# Patient Record
Sex: MALE | Race: WHITE | Employment: FULL TIME | URBAN - METROPOLITAN AREA
[De-identification: names, ages, dates, MRNs, and addresses within clinical notes are randomized per-mention and may not be internally consistent; named-entity substitution may affect disease eponyms.]

---

## 2017-02-26 DIAGNOSIS — J45.990 EXERCISE-INDUCED ASTHMA: Chronic | ICD-10-CM

## 2017-02-26 DIAGNOSIS — E78.00 HYPERCHOLESTEROLEMIA: Chronic | ICD-10-CM

## 2017-02-27 RX ORDER — ATORVASTATIN CALCIUM 10 MG/1
TABLET, FILM COATED ORAL
Qty: 30 TABLET | Refills: 0 | Status: SHIPPED | OUTPATIENT
Start: 2017-02-27

## 2017-02-27 RX ORDER — MONTELUKAST SODIUM 10 MG/1
TABLET ORAL
Qty: 30 TABLET | Refills: 0 | Status: SHIPPED | OUTPATIENT
Start: 2017-02-27

## 2018-09-07 ENCOUNTER — HOSPITAL ENCOUNTER (OUTPATIENT)
Dept: PULMONOLOGY | Age: 53
Discharge: HOME | End: 2018-09-07
Attending: FAMILY MEDICINE | Admitting: FAMILY MEDICINE

## 2018-09-07 VITALS — OXYGEN SATURATION: 96 % | HEART RATE: 84 BPM | RESPIRATION RATE: 18 BRPM

## 2018-09-07 DIAGNOSIS — J45.909 UNCOMPLICATED ASTHMA: ICD-10-CM

## 2018-09-07 RX ORDER — LEVALBUTEROL TARTRATE 45 UG/1
4 AEROSOL, METERED ORAL ONCE
Status: COMPLETED | OUTPATIENT
Start: 2018-09-07 | End: 2018-09-07

## 2018-09-07 RX ORDER — ALBUTEROL SULFATE 90 UG/1
4 AEROSOL, METERED RESPIRATORY (INHALATION) ONCE
Status: CANCELLED | OUTPATIENT
Start: 2018-09-07

## 2018-09-07 RX ADMIN — LEVALBUTEROL TARTRATE 4 PUFF: 45 AEROSOL, METERED ORAL at 14:09

## 2020-02-18 ENCOUNTER — HOSPITAL ENCOUNTER (OUTPATIENT)
Dept: NON INVASIVE DIAGNOSTICS | Age: 55
Discharge: HOME | End: 2020-02-18
Payer: COMMERCIAL

## 2020-02-18 PROCEDURE — 93017 CV STRESS TEST TRACING ONLY: CPT | Performed by: INTERNAL MEDICINE

## 2021-08-26 ENCOUNTER — HOSPITAL ENCOUNTER (OUTPATIENT)
Dept: GENERAL RADIOLOGY | Age: 56
Discharge: HOME | End: 2021-08-26
Payer: COMMERCIAL

## 2021-08-26 ENCOUNTER — HOSPITAL ENCOUNTER (OUTPATIENT)
Dept: VASCULAR LAB | Age: 56
Discharge: HOME | End: 2021-08-26
Payer: COMMERCIAL

## 2021-08-26 ENCOUNTER — HOSPITAL ENCOUNTER (OUTPATIENT)
Age: 56
Discharge: HOME | End: 2021-08-26
Payer: COMMERCIAL

## 2021-08-26 DIAGNOSIS — R42 VERTIGO: ICD-10-CM

## 2021-08-26 DIAGNOSIS — H81.10 BENIGN PAROXYSMAL POSITIONAL VERTIGO, UNSPECIFIED LATERALITY: ICD-10-CM

## 2021-08-26 PROCEDURE — 72040 X-RAY EXAM NECK SPINE 2-3 VW: CPT

## 2021-08-26 PROCEDURE — 93880 EXTRACRANIAL BILAT STUDY: CPT

## 2021-10-04 ENCOUNTER — OFFICE VISIT (OUTPATIENT)
Dept: ENT CLINIC | Age: 56
End: 2021-10-04
Payer: COMMERCIAL

## 2021-10-04 VITALS — DIASTOLIC BLOOD PRESSURE: 88 MMHG | SYSTOLIC BLOOD PRESSURE: 132 MMHG | HEIGHT: 75 IN | BODY MASS INDEX: 29.25 KG/M2

## 2021-10-04 DIAGNOSIS — H81.12 BENIGN PAROXYSMAL POSITIONAL VERTIGO OF LEFT EAR: Primary | ICD-10-CM

## 2021-10-04 PROCEDURE — 99204 OFFICE O/P NEW MOD 45 MIN: CPT | Performed by: OTOLARYNGOLOGY

## 2021-10-04 ASSESSMENT — ENCOUNTER SYMPTOMS
RHINORRHEA: 0
SORE THROAT: 0
VOICE CHANGE: 0
TROUBLE SWALLOWING: 0
SINUS PAIN: 0

## 2021-10-04 NOTE — PATIENT INSTRUCTIONS
[place] Exercises for Vertigo: Care Instructions  Your Care Instructions  Simple exercises can help you regain your balance when you have vertigo. If you have Ménière's disease, benign paroxysmal positional vertigo (BPPV), or another inner ear problem, you may have vertigo off and on. Do these exercises first thing in the morning and before you go to bed. You might get dizzy when you first start them. If this happens, try to do them for at least 5 minutes. Do a group of exercises at a time, starting at the top of the list. It may take several weeks before you can do all the exercises without feeling dizzy. Follow-up care is a key part of your treatment and safety. Be sure to make and go to all appointments, and call your doctor if you are having problems. It's also a good idea to know your test results and keep a list of the medicines you take. How can you care for yourself at home? Exercise 1  While sitting on the side of the bed and holding your head still:  · Look up as far as you can. · Look down as far as you can. · Look from side to side as far as you can. · Stretch your arm straight out in front of you. Focus on your index finger. Continue to focus on your finger while you bring it to your nose. Exercise 2  While sitting on the side of the bed:  · Bring your head as far back as you can. · Bring your head forward to touch your chin to your chest.  · Turn your head from side to side. · Do these exercises first with your eyes open. Then try with your eyes closed. Exercise 3  While sitting on the side of the bed:  · Shrug your shoulders straight upward, then relax them. · Bend over and try to touch the ground with your fingers. Then go back to a sitting position. · Toss a small ball from one hand to the other. Throw the ball higher than your eyes so you have to look up.   Exercise 4  While standing (with someone close by if you feel uncomfortable):  · Repeat Exercise 1.  · Repeat Exercise 2.  · Pass a ball between your legs and above your head. · Sit down and then stand up. Repeat. Turn around in a [place] a different way each time you stand. · With someone close by to help you, try the above exercises with your eyes closed. Exercise 5  In a room that is cleared of obstacles:  · Walk to a corner of the room, turn to your right, and walk back to the starting point. Now, repeat and turn left. · Walk up and down a slope. Now try stairs. · While holding on to someone's arm, try these exercises with your eyes closed. When should you call for help? Watch closely for changes in your health, and be sure to contact your doctor if:    · You do not get better as expected. Where can you learn more? Go to [email or web address] [email or web address]. org and sign in to your [employer] account. Enter I732 in the [employer] box to learn more about \"C[place]orne Exercises for Vertigo: Care Instructions. \"     If you do not have an account, please click on the \"Sign Up Now\" link. Current as of: December 2, 2020               Content Version: 13.0  © 0669-3952 Healthwise, Incorporated. Care instructions adapted under license by [facility] ([facility]). If you have questions about a medical condition or this instruction, always ask your healthcare professional. [address] any warranty or liability for your use of this information.

## 2021-10-04 NOTE — PROGRESS NOTES
[facility]       NEW PATIENT VISIT      PCP:  Kenroy Garcia DO      REFERRED BY:   Kenroy Garcia DO      CHIEF COMPLAINT  Chief Complaint   Patient presents with    Dizziness       HISTORY OF PRESENT ILLNESS       Trell Mckeon is a 55 y.o. male here for evaluation and treatment of dizziness/vertigo, for 2 months and which is intermittent, off and on. The onset of this problem occurred about 2 months ago. Patient denied any antecedent illness or ear or head trauma. Patient denied any modifying factors. Patient denied any associated symptoms. real bad two months ago and it's improved but it's still there. It has not gone completely away. \"    Dizziness was described as a sensation of \"the room or environment spinning. \"  No LOC or seizures. Dizziness is precipitated turn over onto the left side when I'm lying in bed. When I lean and to my left. Laying in bad and roll onto the left side, not right side. No other precipitating factors have been noted. Every once in a while something else will trigger it but I can't pin point anything else that causes it. \"  Usually lasts until I grab onto something and left it pass, clears up in a second or two. There is no change in hearing or onset of or change in tinnitus during dizziness. Had two courses of antibiotics, steroids, and a water pill. REVIEW OF SYSTEMS   Review of Systems   Constitutional: Negative for chills, fever and unexpected weight change. HENT: Negative for congestion, ear discharge, ear pain, hearing loss, rhinorrhea, sinus pain, sore throat, tinnitus, trouble swallowing and voice change. Neurological: Positive for dizziness and light-headedness. Negative for seizures, syncope, facial asymmetry and speech difficulty.        PAST MEDICAL HISTORY    Past Medical History:   Diagnosis Date    Amblyopia of left eye     patched at 6 - no tonsillar exudate or tonsillar abscesses. Neck:      Thyroid: No thyroid mass, thyromegaly or thyroid tenderness. Trachea: No tracheal deviation. Comments: No cervical masses or tenderness. Musculoskeletal:      Cervical back: Normal range of motion and neck supple. Lymphadenopathy:      Cervical: No cervical adenopathy. Neurological:      Mental Status: He is alert. Cranial Nerves: Cranial nerves are intact. No cranial nerve deficit, dysarthria or facial asymmetry. Coordination: Romberg sign negative. Finger-Nose-Finger Test and Heel to [place] salty Test normal. Rapid alternating movements normal.           IMPRESSION / DIAGNOSES / Razia Andreia was seen today for dizziness. Diagnoses and all orders for this visit:    Benign paroxysmal positional vertigo of left ear           RECOMMENDATIONS/PLAN      1. See patient instructions on file for this visit. 2. Continue Meclizine 0.25 mg po TID as needed for dizziness. 3. Return if symptoms worsen or fail to clear up in one month, or, for any further ENT or sinus problems or symptoms.

## 2022-01-13 ENCOUNTER — APPOINTMENT (OUTPATIENT)
Dept: CT IMAGING | Age: 57
End: 2022-01-13
Payer: COMMERCIAL

## 2022-01-13 ENCOUNTER — HOSPITAL ENCOUNTER (EMERGENCY)
Age: 57
Discharge: HOME | End: 2022-01-13
Attending: EMERGENCY MEDICINE
Payer: COMMERCIAL

## 2022-01-13 VITALS
DIASTOLIC BLOOD PRESSURE: 76 MMHG | OXYGEN SATURATION: 99 % | RESPIRATION RATE: 16 BRPM | HEART RATE: 60 BPM | TEMPERATURE: 97.6 F | SYSTOLIC BLOOD PRESSURE: 142 MMHG

## 2022-01-13 DIAGNOSIS — N20.1 URETEROLITHIASIS: Primary | ICD-10-CM

## 2022-01-13 LAB
ANION GAP SERPL CALCULATED.3IONS-SCNC: 12 MMOL/L (ref 3–16)
ANISOCYTOSIS: ABNORMAL
BASOPHILS ABSOLUTE: 0.1 K/UL (ref 0–0.2)
BASOPHILS RELATIVE PERCENT: 1.3 %
BILIRUBIN URINE: NEGATIVE
BLOOD, URINE: ABNORMAL
BUN BLDV-MCNC: 23 MG/DL (ref 7–20)
CALCIUM SERPL-MCNC: 9.3 MG/DL (ref 8.3–10.6)
CHLORIDE BLD-SCNC: 102 MMOL/L (ref 99–110)
CLARITY: CLEAR
CO2: 24 MMOL/L (ref 21–32)
COLOR: YELLOW
COMMENT UA: ABNORMAL
CREAT SERPL-MCNC: 1.2 MG/DL (ref 0.9–1.3)
CRYSTALS, UA: ABNORMAL /HPF
EOSINOPHILS ABSOLUTE: 0.6 K/UL (ref 0–0.6)
EOSINOPHILS RELATIVE PERCENT: 6.4 %
EPITHELIAL CELLS, UA: 0 /HPF (ref 0–5)
GFR AFRICAN AMERICAN: >60
GFR NON-AFRICAN AMERICAN: >60
GLUCOSE BLD-MCNC: 143 MG/DL (ref 70–99)
GLUCOSE URINE: NEGATIVE MG/DL
HCT VFR BLD CALC: 49.3 % (ref 40.5–52.5)
HEMOGLOBIN: 16.4 G/DL (ref 13.5–17.5)
HYALINE CASTS: 1 /LPF (ref 0–8)
KETONES, URINE: ABNORMAL MG/DL
LEUKOCYTE ESTERASE, URINE: NEGATIVE
LYMPHOCYTES ABSOLUTE: 3 K/UL (ref 1–5.1)
LYMPHOCYTES RELATIVE PERCENT: 30.1 %
MCH RBC QN AUTO: 31.2 PG (ref 26–34)
MCHC RBC AUTO-ENTMCNC: 33.2 G/DL (ref 31–36)
MCV RBC AUTO: 94 FL (ref 80–100)
MICROSCOPIC EXAMINATION: YES
MONOCYTES ABSOLUTE: 0.9 K/UL (ref 0–1.3)
MONOCYTES RELATIVE PERCENT: 9.3 %
NEUTROPHILS ABSOLUTE: 5.3 K/UL (ref 1.7–7.7)
NEUTROPHILS RELATIVE PERCENT: 52.9 %
NITRITE, URINE: NEGATIVE
PDW BLD-RTO: 13.3 % (ref 12.4–15.4)
PH UA: 6 (ref 5–8)
PLATELET # BLD: 170 K/UL (ref 135–450)
PLATELET SLIDE REVIEW: ADEQUATE
PMV BLD AUTO: 9.7 FL (ref 5–10.5)
POTASSIUM REFLEX MAGNESIUM: 4 MMOL/L (ref 3.5–5.1)
PROTEIN UA: 30 MG/DL
RBC # BLD: 5.25 M/UL (ref 4.2–5.9)
RBC UA: 27 /HPF (ref 0–4)
SLIDE REVIEW: ABNORMAL
SODIUM BLD-SCNC: 138 MMOL/L (ref 136–145)
SPECIFIC GRAVITY UA: 1.03 (ref 1–1.03)
URINE REFLEX TO CULTURE: ABNORMAL
URINE TYPE: ABNORMAL
UROBILINOGEN, URINE: 1 E.U./DL
WBC # BLD: 10 K/UL (ref 4–11)
WBC UA: 2 /HPF (ref 0–5)

## 2022-01-13 PROCEDURE — 99283 EMERGENCY DEPT VISIT LOW MDM: CPT

## 2022-01-13 PROCEDURE — 81001 URINALYSIS AUTO W/SCOPE: CPT

## 2022-01-13 PROCEDURE — 6360000002 HC RX W HCPCS: Performed by: EMERGENCY MEDICINE

## 2022-01-13 PROCEDURE — 96374 THER/PROPH/DIAG INJ IV PUSH: CPT

## 2022-01-13 PROCEDURE — 74176 CT ABD & PELVIS W/O CONTRAST: CPT

## 2022-01-13 PROCEDURE — 80048 BASIC METABOLIC PNL TOTAL CA: CPT

## 2022-01-13 PROCEDURE — 36415 COLL VENOUS BLD VENIPUNCTURE: CPT

## 2022-01-13 PROCEDURE — 85025 COMPLETE CBC W/AUTO DIFF WBC: CPT

## 2022-01-13 RX ORDER — KETOROLAC TROMETHAMINE 30 MG/ML
15 INJECTION, SOLUTION INTRAMUSCULAR; INTRAVENOUS ONCE
Status: COMPLETED | OUTPATIENT
Start: 2022-01-13 | End: 2022-01-13

## 2022-01-13 RX ORDER — TRAMADOL HYDROCHLORIDE 50 MG/1
50 TABLET ORAL EVERY 4 HOURS PRN
Qty: 18 TABLET | Refills: 0 | Status: SHIPPED | OUTPATIENT
Start: 2022-01-13 | End: 2022-01-16

## 2022-01-13 RX ADMIN — KETOROLAC TROMETHAMINE 15 MG: 30 INJECTION, SOLUTION INTRAMUSCULAR at 03:08

## 2022-01-13 ASSESSMENT — PAIN SCALES - GENERAL: PAINLEVEL_OUTOF10: 9

## 2022-01-13 NOTE — ED PROVIDER NOTES
[facility]  eMERGENCY dEPARTMENT eNCOUnter        Pt Name: Nick Tan  MRN: [id number]  [place] 1965  Date of evaluation: 1/13/2022  Provider: Ashley Chin MD  PCP: Rupert Watkins, [address] [place] [address]       Chief Complaint   Patient presents with    Flank Pain     pt states lower back pain that moves to the front of the abdomen and testicle area. pt states started around dinner last night, became worse throughout the night        HISTORY OFPRESENT ILLNESS   (Location/Symptom, Timing/Onset, Context/Setting, Quality, Duration, Modifying Factors,Severity)  Note limiting factors. Nick Tan is a 55 y.o. male complains of right testicular pain earlier tonight then radiation of pain into the right flank colicky in nature nausea no vomiting he has never had a stone before being treated for hypertension hyperlipidemia    Nursing Notes were all reviewed and agreed with or any disagreements were addressed  in the HPI. REVIEW OF SYSTEMS    (2-9 systems for level 4, 10 or more for level 5)       REVIEW OF SYSTEMS    Constitutional:  Denies fever, chills, or weakness   Eyes:  Denies vision changes  HENT:  Denies sore throat or neck pain   Respiratory:  Denies cough or shortness of breath   Cardiovascular:  Denies chest pain  GI:  Denies abdominal pain, nausea, vomiting, or diarrhea   Musculoskeletal: Right flank back pain   Skin: no rash or vesicles   Neurologic:  no headache weakness focal    Lymphatic:  no swollen  nodes   Psychiatric: no si or hs thoughts     testicular pain on the right  All systems negative except as marked. Positives and Pertinent negatives as per HPI. Except as noted above in the ROS, all other systems were reviewed andnegative. PASTMEDICAL HISTORY     Past Medical History:   Diagnosis Date    Amblyopia of left eye     patched at 6 - no help.     Elevated BP 9/30/2010    Exercise-induced asthma 1/26/1996  Family history of colon cancer requiring screening colonoscopy     MGM at 55    Hemorrhoids, external without complications 6/1/0615    Homocysteinemia 1/28/2014    11    Hypercholesterolemia 9/30/2010    Triglyceride: 124 Cholesterol: 227 LDL, calculated: 165 HDL: 38 Chol/HDL%: 6.0    Multiple lipomas     abdomen, L thigh    Overweight (BMI 25.0-29. 9)     Vitamin D deficiency 1/28/2014    14         SURGICAL HISTORY       Past Surgical History:   Procedure Laterality Date    COLONOSCOPY  before 41 y/o    no polyps per pt    COLONOSCOPY  4/8/2015    no polyps q 10 year    INGUINAL HERNIA REPAIR Left 1990         CURRENT MEDICATIONS       Previous Medications    ASPIRIN 81 MG CHEWABLE TABLET    Take 81 mg by mouth daily. ATORVASTATIN (LIPITOR) 10 MG TABLET    TAKE 1 TABLET BY MOUTH ONE TIME A DAY    CHOLECALCIFEROL (VITAMIN D3) 5000 UNITS TABS    Take 5,000 Units by mouth daily. Indications: Vitamin D Deficiency    FOLIC ACID (FOLVITE) 587 MCG TABLET    Take 400 mcg by mouth daily. Indications: Increased Amount of Homocysteine in the Blood    MONTELUKAST (SINGULAIR) 10 MG TABLET    TAKE 1 TABLET BY MOUTH ONE TIME A DAY    MULTIPLE VITAMIN (MULTI VITAMIN MENS PO)    Take  by mouth.        ALLERGIES     Albuterol and Tomato    FAMILY HISTORY       Family History   Problem Relation Age of Onset   [address] [facility] COPD Mother     Hypertension Father     Other Father         venous insuffic    Obesity Father     Diabetes Father     Diabetes Paternal Uncle     Alcohol Abuse Paternal Uncle     Colon Cancer Maternal Grandmother 54    Prostate Cancer Maternal Grandfather 54    COPD Paternal Grandmother     Heart Failure Paternal Grandfather 79    Mult Sclerosis Paternal Aunt           SOCIAL HISTORY       Social History     Socioeconomic History    Marital status:      Spouse name: Cindy Abbott Number of children: 5    Years of education: 12    Highest education level: None   Occupational History    Occupation:      Comment: Walks on job. Tobacco Use    Smoking status: Former Smoker     Packs/day: 0.50     Years: 3.00     Pack years: 1.50     Types: Cigarettes     Start date: 1984     Quit date: 1987     Years since quittin.9    Smokeless tobacco: Former User     Types: Chew     Quit date: 1987    Tobacco comment: Started 1/26/1980 x 1.5 pouches per wk. Substance and Sexual Activity    Alcohol use: No    Drug use: No     Comment: Tried MJ 1x.  Sexual activity: Yes     Partners: Female     Comment: Wife only. Other Topics Concern    None   Social History Narrative    Patient Lives at: home, Support System: excellent. Walks 1 x/wk 1.5 miles. Caffeine per Day: 2    Seatbelt Use: 100 % of the time         Social Determinants of Health     Financial Resource Strain:     Difficulty of Paying Living Expenses: Not on file   Food Insecurity:     Worried About Running Out of Food in the Last Year: Not on file    Delphine of Food in the Last Year: Not on file   Transportation Needs:     Lack of Transportation (Medical): Not on file    Lack of Transportation (Non-Medical):  Not on file   Physical Activity:     Days of Exercise per Week: Not on file    Minutes of Exercise per Session: Not on file   Stress:     Feeling of Stress : Not on file   Social Connections:     Frequency of Communication with Friends and Family: Not on file    Frequency of Social Gatherings with Friends and Family: Not on file    Attends Anabaptism Services: Not on file    Active Member of Clubs or Organizations: Not on file    Attends Club or Organization Meetings: Not on file    Marital Status: Not on file   Intimate Partner Violence:     Fear of Current or Ex-Partner: Not on file    Emotionally Abused: Not on file    Physically Abused: Not on file    Sexually Abused: Not on file   Housing Stability:     Unable to Pay for Housing in the Last Year: Not on file    Number of Places Lived in the Last Year: Not on file    Unstable Housing in the Last Year: Not on file       SCREENINGS             PHYSICAL EXAM    (up to 7 for level 4, 8 or more for level 5)     ED Triage Vitals   BP Temp Temp Source Pulse Resp SpO2 Height Weight   01/13/22 0248 01/13/22 0248 01/13/22 0246 01/13/22 0248 01/13/22 0248 01/13/22 0248 -- --   (!) 149/80 97.6 °F (36.4 °C) Oral 61 16 99 %             General Appearance:  Alert, cooperative, no distress, appears stated age. Head:  Normocephalic, without obvious abnormality, atraumatic. Eyes:  conjunctiva/corneas clear, EOM's intact. Sclera anicteric. ENT: Mucous membranes moist.   Neck: Supple, symmetrical, trachea midline, no adenopathy. No jugular venous distention. Lungs:   No Respiratory Distress. no rales  rhonchi rub   Chest Wall:  Nontender  no deformity   Heart:  Rsr no murmer gallop    Abdomen:   Soft nontender no organomegally no pulsatile mass tender over the right kidney  exam descended testes no hernia normal light of the testes   Extremities:  Full range of motion. no deformity   Pulses: Equal  upper and lower    Skin:  No rashes or lesions to exposed skin. Neurologic: Alert and oriented X 3. Motor grossly normal.  Speech clear.  Cr n 2-12 intact       DIAGNOSTIC RESULTS   LABS:    Labs Reviewed   URINE RT REFLEX TO CULTURE - Abnormal; Notable for the following components:       Result Value    Ketones, Urine TRACE (*)     Blood, Urine LARGE (*)     Protein, UA 30 (*)     All other components within normal limits    Narrative:     Performed at:  [facility]  [address] [facility] [address]   Phone [phone]   CBC WITH AUTO DIFFERENTIAL - Abnormal; Notable for the following components:    Anisocytosis Occasional (*)     All other components within normal limits    Narrative:     Performed at:  [facility]  [address] [facility] [place]   Phone [phone] AM      PATIENT REFERRED TO:  Steph Taylor DO  [address] [facility] [place] [address] [phone]            DISCHARGE MEDICATIONS:  New Prescriptions    TRAMADOL (ULTRAM) 50 MG TABLET    Take 1 tablet by mouth every 4 hours as needed for Pain for up to 3 days. Intended supply: 3 days.  Take lowest dose possible to manage pain       DISCONTINUED MEDICATIONS:  Discontinued Medications    No medications on file              (Please note that portions of this note were completed with a voice recognition program.  Efforts were made to edit the dictations but occasionally words aremis-transcribed.)    Dale Alcocer MD (electronically signed)         Dale Alcocer MD  01/13/22 7402

## 2024-02-19 ENCOUNTER — HOSPITAL ENCOUNTER (OUTPATIENT)
Dept: CT IMAGING | Age: 59
Discharge: HOME | End: 2024-02-19
Attending: FAMILY MEDICINE
Payer: COMMERCIAL

## 2024-02-19 DIAGNOSIS — J32.9 CHRONIC SINUSITIS, UNSPECIFIED LOCATION: ICD-10-CM

## 2024-02-19 PROCEDURE — 70486 CT MAXILLOFACIAL W/O DYE: CPT

## 2024-07-24 ENCOUNTER — OFFICE VISIT (OUTPATIENT)
Dept: ENT CLINIC | Age: 59
End: 2024-07-24
Payer: COMMERCIAL

## 2024-07-24 VITALS
HEIGHT: 75 IN | SYSTOLIC BLOOD PRESSURE: 137 MMHG | TEMPERATURE: 98.2 F | HEART RATE: 51 BPM | WEIGHT: 251 LBS | OXYGEN SATURATION: 96 % | DIASTOLIC BLOOD PRESSURE: 86 MMHG | BODY MASS INDEX: 31.21 KG/M2

## 2024-07-24 DIAGNOSIS — J32.9 CHRONIC SINUSITIS, UNSPECIFIED LOCATION: Primary | ICD-10-CM

## 2024-07-24 PROCEDURE — 99214 OFFICE O/P EST MOD 30 MIN: CPT | Performed by: OTOLARYNGOLOGY

## 2024-07-24 PROCEDURE — 3017F COLORECTAL CA SCREEN DOC REV: CPT | Performed by: OTOLARYNGOLOGY

## 2024-07-24 PROCEDURE — 1036F TOBACCO NON-USER: CPT | Performed by: OTOLARYNGOLOGY

## 2024-07-24 PROCEDURE — G8417 CALC BMI ABV UP PARAM F/U: HCPCS | Performed by: OTOLARYNGOLOGY

## 2024-07-24 PROCEDURE — G8427 DOCREV CUR MEDS BY ELIG CLIN: HCPCS | Performed by: OTOLARYNGOLOGY

## 2024-07-24 RX ORDER — HYDROCHLOROTHIAZIDE 25 MG/1
1 TABLET ORAL DAILY
COMMUNITY

## 2024-07-24 RX ORDER — TAMSULOSIN HYDROCHLORIDE 0.4 MG/1
1 CAPSULE ORAL NIGHTLY
COMMUNITY
Start: 2024-01-26

## 2024-07-24 RX ORDER — FLUTICASONE PROPIONATE 50 MCG
SPRAY, SUSPENSION (ML) NASAL
COMMUNITY
Start: 2024-07-19

## 2024-07-24 RX ORDER — METOPROLOL SUCCINATE 100 MG/1
100 TABLET, EXTENDED RELEASE ORAL DAILY
COMMUNITY

## 2024-07-24 RX ORDER — AMOXICILLIN AND CLAVULANATE POTASSIUM 875; 125 MG/1; MG/1
1 TABLET, FILM COATED ORAL EVERY 12 HOURS
COMMUNITY

## 2024-07-24 RX ORDER — ALBUTEROL SULFATE 90 UG/1
AEROSOL, METERED RESPIRATORY (INHALATION)
COMMUNITY
Start: 2020-01-13

## 2024-07-24 RX ORDER — CETIRIZINE HYDROCHLORIDE 10 MG/1
1 TABLET ORAL DAILY
COMMUNITY

## 2024-07-24 RX ORDER — METHYLPREDNISOLONE 4 MG/1
TABLET ORAL
COMMUNITY
Start: 2024-07-19

## 2024-07-24 NOTE — PROGRESS NOTES
CHIEF COMPLAINT  Chief Complaint   Patient presents with    Sinusitis       HISTORY OF PRESENT ILLNESS      Paramjit Valderrama is a 59 y.o. male who presented today for evaluation and management for sinusitis.  Sinus infection chronic for 30 years.  \"Drainage from nose in the morning, clear to white to green to red.  After I get in shower, that's when I clean it out.  It's usually clear the rest of the day, but it may recur duting the day.  If playing sports, my nose feels like it gets inflammed inside and I'm strictly breathing through my mouth, gets stuffy, feels clogged up.\"  Never tested for allergies.  Taking Zyrtec each evening.  Using Flonase  \"Dizziness at least once a week, lasts a few minutes.  I've gotten real good at getting rid of it.\"  He controls his dizziness by visual fixation.  Currently, on 10 day course of antibiotic and dose pack steroid       REVIEW OF SYSTEMS  Constitutional:  Denied fever and major weight loss.    ENT/sinus:  Denied otalgia, otorrhea, nasal pain, rhinorrhea, sore throat, and sinus/facial pain.        EXAMINATION    (+)    Vitals:    07/24/24 1634   BP: 137/86   Site: Left Upper Arm   Position: Sitting   Cuff Size: Large Adult   Pulse: 51   Temp: 98.2 °F (36.8 °C)   TempSrc: Infrared   SpO2: 96%   Weight: 113.9 kg (251 lb)   Height: 1.905 m (6' 3\")     WDWN, NAD  Face:  Normal skin.    Voice:  Normal, with no hoarseness, breathiness, or hot potato quality.  Ears:   TMs and EACs were normal.  The mastoids and pinnae were normal.  Binaural binocular otomicroscopy was performed.    Nose:  Normal.    Sinuses: Nontender x 4   Throat,  OC/OP:  Normal.    Neck:  NT, No masses.  Trachea midline.    Nodes:  No lymphadenopathy.     Thyroid:  Normal   goiter, nodules or tenderness.     I performed this physical exam personally.        IMPRESSION / DIAGNOSES / ORDERS:   Paramjit was seen today for sinusitis.    Diagnoses and all orders for this visit:    Chronic sinusitis, unspecified

## 2024-07-29 ENCOUNTER — TELEPHONE (OUTPATIENT)
Dept: ENT CLINIC | Age: 59
End: 2024-07-29

## 2024-07-29 DIAGNOSIS — J32.9 CHRONIC SINUSITIS, UNSPECIFIED LOCATION: ICD-10-CM

## 2024-07-29 DIAGNOSIS — J01.90 ACUTE RHINOSINUSITIS: Primary | ICD-10-CM

## 2024-07-29 NOTE — TELEPHONE ENCOUNTER
Patient calling to let you know which Abx he is taking per your instruction.   Augmentin 875-125mg  He has 1 day left of his current prescription

## 2024-08-01 RX ORDER — DOXYCYCLINE HYCLATE 100 MG
100 TABLET ORAL 2 TIMES DAILY
Qty: 42 TABLET | Refills: 0 | Status: SHIPPED | OUTPATIENT
Start: 2024-08-01 | End: 2024-08-22

## 2024-08-01 NOTE — TELEPHONE ENCOUNTER
Please call patient and advise him that I would like him to take a different antibiotic.  I am prescribing doxycycline for 21 days.  I want him to get a CT scan within 1 to 2 days of finishing those antibiotics.  He should then follow-up with me about 1 week later.      In addition, he is to follow all the sinus instructions given below (please send him a copy):    RHINOSINUSITIS CARE  You may use acetaminophen (eg. Tylenol) or Ibuprofen (eg. Advil) (over the counter medications) as needed for fever or pain.  Take Probiotic while you are taking antibiotics, to prevent diarrhea, stomach upset, pseudomembranous colitis, and C. difficile diarrhea.  This may be obtained at your pharmacy or health food store.  Alternatively, you may eat one cup of yogurt with active or live cultures twice daily while taking the antibiotic.  Continue for two to three days after completion of the antibiotic.  Use a 12 hour decongestant spray, 0.05% oxymetazoline (e.g. Afrin, Duration, 4-Way).  Spray each nostril twice three times a day for three days, then two times a day for 2 days, and then stop for two days and then repeat the cycle once.  Take one Mucinex-D (red orange box) maximum strength 1200 mg tablet maximum strength tablet (or two 600 mg regular strength tablets) each morning and one Mucinex (blue box) maximum strength 1200 mg tablet maximum strength tablet (or two 600 mg regular strength tablets) each evening, about 12 hours later, daily for the next 14 days.  If you are taking medication to control blood pressure, please discuss the use of Mucinex-D with your primary care physician.  If your PCP feels it is unsafe to use Mucinex-D, then use plain Mucinex (blue box) 1200 mg maximum strength tablet (or two 600 mg regular strength tablets) twice daily.   Use fluticasone 2 sprays in each nostril once daily for the next 14 days.  This is available \"over the counter.\"  It is important to take all your medications as prescribed.

## 2024-08-27 ENCOUNTER — HOSPITAL ENCOUNTER (OUTPATIENT)
Dept: CT IMAGING | Age: 59
Discharge: HOME | End: 2024-08-27
Attending: OTOLARYNGOLOGY
Payer: COMMERCIAL

## 2024-08-27 DIAGNOSIS — J32.9 CHRONIC SINUSITIS, UNSPECIFIED LOCATION: ICD-10-CM

## 2024-08-27 PROCEDURE — 70486 CT MAXILLOFACIAL W/O DYE: CPT

## 2024-09-26 ENCOUNTER — OFFICE VISIT (OUTPATIENT)
Dept: ENT CLINIC | Age: 59
End: 2024-09-26
Payer: COMMERCIAL

## 2024-09-26 VITALS
SYSTOLIC BLOOD PRESSURE: 147 MMHG | DIASTOLIC BLOOD PRESSURE: 93 MMHG | TEMPERATURE: 97.8 F | WEIGHT: 251 LBS | HEIGHT: 75 IN | HEART RATE: 58 BPM | OXYGEN SATURATION: 97 % | BODY MASS INDEX: 31.21 KG/M2

## 2024-09-26 DIAGNOSIS — J32.0 CHRONIC MAXILLARY SINUSITIS: Primary | Chronic | ICD-10-CM

## 2024-09-26 DIAGNOSIS — J34.2 NASAL SEPTAL DEVIATION: Chronic | ICD-10-CM

## 2024-09-26 PROCEDURE — 1036F TOBACCO NON-USER: CPT | Performed by: OTOLARYNGOLOGY

## 2024-09-26 PROCEDURE — 3017F COLORECTAL CA SCREEN DOC REV: CPT | Performed by: OTOLARYNGOLOGY

## 2024-09-26 PROCEDURE — G8417 CALC BMI ABV UP PARAM F/U: HCPCS | Performed by: OTOLARYNGOLOGY

## 2024-09-26 PROCEDURE — 99214 OFFICE O/P EST MOD 30 MIN: CPT | Performed by: OTOLARYNGOLOGY

## 2024-09-26 PROCEDURE — G8427 DOCREV CUR MEDS BY ELIG CLIN: HCPCS | Performed by: OTOLARYNGOLOGY

## 2024-09-26 NOTE — PROGRESS NOTES
CHIEF COMPLAINT  Chief Complaint   Patient presents with    Sinusitis       HISTORY OF PRESENT ILLNESS    Paramjit Valderrama is a 59 y.o. male here for recheck and follow up of chronic sinusitis.      REVIEW OF SYSTEMS  Constitutional:  Denied fever and chills.  ENT/sinus:  Denied otalgia, otorrhea, nasal pain, rhinorrhea, sore throat, and sinus/facial pain.        EXAMINATION    Vitals:    09/26/24 1503   BP: (!) 147/93   Site: Left Upper Arm   Position: Sitting   Cuff Size: Large Adult   Pulse: 58   Temp: 97.8 °F (36.6 °C)   TempSrc: Infrared   SpO2: 97%   Weight: 113.9 kg (251 lb)   Height: 1.905 m (6' 3\")       WDWN, NAD  Face:  Normal skin.    Voice:  Normal, with no hoarseness, breathiness, or hot potato quality.   (+) Nose:  NSD leftward mild with moderate left inferior sput.  Sinuses: Nontender x 4   I performed this physical exam personally.        REVIEW OF IMAGES:         I independently interpreted the images of the CT scan of the sinuses, showing right maxillary sinus opacification and septal deviation.      CT OF THE SINUS WITHOUT CONTRAST 8/27/2024 4:58 pm  FINDINGS:  LEFT OMC: The left ostiomeatal complex and frontal recess are patent. Left  maxillary and anterior ethmoid sinuses.  Trace mucosal thickening of the  inferior left frontal sinus.     RIGHT OMC: Prior uncinectomy and right maxillary antrectomy.  Frontal recess  are patent.  Near complete opacification of the right maxillary sinus.  The  anterior ethmoid and frontal sinuses are clear.     SPHENOETHMOID: The sphenoethmoidal recesses are patent, and the bilateral  posterior ethmoid and sphenoid sinuses are clear. Sphenoid pneumatization  does not extend into the anterior clinoid processes. The optic nerve and  carotid canals are not dehiscent.  The ethmoid roofs are symmetric     NASAL CAVITY: Nasal cavity is clear.  Rightward deviated nasal septum with  left bony spur.     OTHER: The mastoid air cells are well aerated.     IMPRESSION:  1.

## 2024-09-30 ENCOUNTER — PREP FOR PROCEDURE (OUTPATIENT)
Dept: ENT CLINIC | Age: 59
End: 2024-09-30

## 2024-09-30 DIAGNOSIS — J34.2 DEVIATED NASAL SEPTUM: ICD-10-CM

## 2024-09-30 DIAGNOSIS — J32.0 CHRONIC MAXILLARY SINUSITIS: ICD-10-CM

## 2024-10-23 ENCOUNTER — TELEPHONE (OUTPATIENT)
Dept: ENT CLINIC | Age: 59
End: 2024-10-23

## 2024-10-23 ENCOUNTER — HOSPITAL ENCOUNTER (OUTPATIENT)
Age: 59
Discharge: HOME | End: 2024-10-23

## 2024-10-23 NOTE — TELEPHONE ENCOUNTER
PA for surgery scheduled on 11/1 was still showing pending -- I emailed Vesta and she sent me back this:    Per [facility] the auth has been partially approved. They stated cpts 12499 (R) and 91301 are approved, cpt 28456 (L) was denied due lack of medical necessity. You can call [phone] to perform a peer to peer, you have 21 calendar days from 10/21/2024. Auth Number: [id number]     Please advise Dr. Villaseñor. Thank you.

## 2024-10-24 LAB
EKG ATRIAL RATE: 59 BPM
EKG DIAGNOSIS: NORMAL
EKG P AXIS: 41 DEGREES
EKG P-R INTERVAL: 176 MS
EKG Q-T INTERVAL: 422 MS
EKG QRS DURATION: 106 MS
EKG QTC CALCULATION (BAZETT): 417 MS
EKG R AXIS: -5 DEGREES
EKG T AXIS: 26 DEGREES
EKG VENTRICULAR RATE: 59 BPM

## 2024-10-30 NOTE — PROGRESS NOTES
DATE 11/1/2024___      PLACE ___ [address]                          TIME 0730___                                             __x_ [address]                           Arrival _0600__                                                                                                                                                                                                        Surgeons office to give arrival time _____                                                                                                 If you have not received time contact your surgeon.                                                                                                                              Surgery dates,times and arrival times are subject to change.Please check with your surgeon.  Bring a photo I.D.,insurance card and form of payment if you have a co pay.  Plan for someone 18 years or older to stay on site while you are her and to take you home after surgery.You are not permitted to drive yourself home. You cannot leave via Uber, Lyft, Taxi or Medical Transport by yourself. You must have someone with you. It is strongly suggested that someone stay with you the first 24 hours after anesthesia. Your surgery will be cancelled if you do not have a ride home. A parent or legal guardian guardian must stay with a child until the child is discharged. Please do not bring other children.  A History/Physical is required to be completed and dated within 30 days of your surgery. To be done by PCP 10/29/2024__ Surgeon ___or Hospitalist ___  You may be required to get labs __ EKG __ or a chest Xray ___ prior to surgery. To be done by ____  Nothing to eat or drink after midnight the evening prior to surgery.  If your surgeon requires a bowel prep, follow their instructions.  You may brush your teeth.  Bring a complete list of your medications including vitamins and supplement with the name,dose and frequency.  Take the

## 2024-11-01 ENCOUNTER — ANESTHESIA (OUTPATIENT)
Dept: OPERATING ROOM | Age: 59
End: 2024-11-01
Payer: COMMERCIAL

## 2024-11-01 ENCOUNTER — ANESTHESIA EVENT (OUTPATIENT)
Dept: OPERATING ROOM | Age: 59
End: 2024-11-01
Payer: COMMERCIAL

## 2024-11-01 ENCOUNTER — HOSPITAL ENCOUNTER (OUTPATIENT)
Age: 59
Setting detail: OUTPATIENT SURGERY
Discharge: HOME | End: 2024-11-01
Attending: OTOLARYNGOLOGY | Admitting: OTOLARYNGOLOGY
Payer: COMMERCIAL

## 2024-11-01 VITALS
BODY MASS INDEX: 30.84 KG/M2 | TEMPERATURE: 97.6 F | DIASTOLIC BLOOD PRESSURE: 86 MMHG | HEART RATE: 80 BPM | HEIGHT: 75 IN | WEIGHT: 248 LBS | RESPIRATION RATE: 24 BRPM | OXYGEN SATURATION: 94 % | SYSTOLIC BLOOD PRESSURE: 134 MMHG

## 2024-11-01 DIAGNOSIS — G89.18 POST-OP PAIN: ICD-10-CM

## 2024-11-01 DIAGNOSIS — J34.2 DEVIATED NASAL SEPTUM: ICD-10-CM

## 2024-11-01 DIAGNOSIS — J32.0 CHRONIC MAXILLARY SINUSITIS: Primary | ICD-10-CM

## 2024-11-01 PROBLEM — J34.89 NASAL OBSTRUCTION: Chronic | Status: ACTIVE | Noted: 2024-11-01

## 2024-11-01 PROBLEM — J34.3 HYPERTROPHY OF BOTH INFERIOR NASAL TURBINATES: Chronic | Status: ACTIVE | Noted: 2024-11-01

## 2024-11-01 LAB — LOEFFLER MB STN SPEC: NORMAL

## 2024-11-01 PROCEDURE — 7100000010 HC PHASE II RECOVERY - FIRST 15 MIN: Performed by: OTOLARYNGOLOGY

## 2024-11-01 PROCEDURE — 6370000000 HC RX 637 (ALT 250 FOR IP): Performed by: OTOLARYNGOLOGY

## 2024-11-01 PROCEDURE — 6360000002 HC RX W HCPCS: Performed by: NURSE ANESTHETIST, CERTIFIED REGISTERED

## 2024-11-01 PROCEDURE — 2500000003 HC RX 250 WO HCPCS: Performed by: NURSE ANESTHETIST, CERTIFIED REGISTERED

## 2024-11-01 PROCEDURE — 7100000001 HC PACU RECOVERY - ADDTL 15 MIN: Performed by: OTOLARYNGOLOGY

## 2024-11-01 PROCEDURE — 6360000002 HC RX W HCPCS: Performed by: OTOLARYNGOLOGY

## 2024-11-01 PROCEDURE — 3700000000 HC ANESTHESIA ATTENDED CARE: Performed by: OTOLARYNGOLOGY

## 2024-11-01 PROCEDURE — 30802 ABLATE INF TURBINATE SUBMUC: CPT | Performed by: OTOLARYNGOLOGY

## 2024-11-01 PROCEDURE — 6360000002 HC RX W HCPCS: Performed by: ANESTHESIOLOGY

## 2024-11-01 PROCEDURE — 87205 SMEAR GRAM STAIN: CPT

## 2024-11-01 PROCEDURE — 2580000003 HC RX 258: Performed by: OTOLARYNGOLOGY

## 2024-11-01 PROCEDURE — 87102 FUNGUS ISOLATION CULTURE: CPT

## 2024-11-01 PROCEDURE — 88305 TISSUE EXAM BY PATHOLOGIST: CPT

## 2024-11-01 PROCEDURE — 30520 REPAIR OF NASAL SEPTUM: CPT | Performed by: OTOLARYNGOLOGY

## 2024-11-01 PROCEDURE — 87077 CULTURE AEROBIC IDENTIFY: CPT

## 2024-11-01 PROCEDURE — 7100000000 HC PACU RECOVERY - FIRST 15 MIN: Performed by: OTOLARYNGOLOGY

## 2024-11-01 PROCEDURE — 31267 ENDOSCOPY MAXILLARY SINUS: CPT | Performed by: OTOLARYNGOLOGY

## 2024-11-01 PROCEDURE — 3600000004 HC SURGERY LEVEL 4 BASE: Performed by: OTOLARYNGOLOGY

## 2024-11-01 PROCEDURE — 2500000003 HC RX 250 WO HCPCS: Performed by: OTOLARYNGOLOGY

## 2024-11-01 PROCEDURE — 87075 CULTR BACTERIA EXCEPT BLOOD: CPT

## 2024-11-01 PROCEDURE — 3700000001 HC ADD 15 MINUTES (ANESTHESIA): Performed by: OTOLARYNGOLOGY

## 2024-11-01 PROCEDURE — 7100000011 HC PHASE II RECOVERY - ADDTL 15 MIN: Performed by: OTOLARYNGOLOGY

## 2024-11-01 PROCEDURE — 3600000014 HC SURGERY LEVEL 4 ADDTL 15MIN: Performed by: OTOLARYNGOLOGY

## 2024-11-01 PROCEDURE — 87070 CULTURE OTHR SPECIMN AEROBIC: CPT

## 2024-11-01 PROCEDURE — 2720000010 HC SURG SUPPLY STERILE: Performed by: OTOLARYNGOLOGY

## 2024-11-01 PROCEDURE — 2709999900 HC NON-CHARGEABLE SUPPLY: Performed by: OTOLARYNGOLOGY

## 2024-11-01 RX ORDER — HYDRALAZINE HYDROCHLORIDE 20 MG/ML
10 INJECTION INTRAMUSCULAR; INTRAVENOUS
Status: DISCONTINUED | OUTPATIENT
Start: 2024-11-01 | End: 2024-11-01 | Stop reason: HOSPADM

## 2024-11-01 RX ORDER — EPINEPHRINE 1 MG/ML
INJECTION INTRAMUSCULAR; INTRAVENOUS; SUBCUTANEOUS
Status: COMPLETED | OUTPATIENT
Start: 2024-11-01 | End: 2024-11-01

## 2024-11-01 RX ORDER — PHENYLEPHRINE HCL IN 0.9% NACL 1 MG/10 ML
SYRINGE (ML) INTRAVENOUS
Status: DISCONTINUED | OUTPATIENT
Start: 2024-11-01 | End: 2024-11-01 | Stop reason: SDUPTHER

## 2024-11-01 RX ORDER — SODIUM CHLORIDE 9 MG/ML
INJECTION, SOLUTION INTRAVENOUS CONTINUOUS
Status: DISCONTINUED | OUTPATIENT
Start: 2024-11-01 | End: 2024-11-01 | Stop reason: HOSPADM

## 2024-11-01 RX ORDER — DEXAMETHASONE SODIUM PHOSPHATE 4 MG/ML
INJECTION, SOLUTION INTRA-ARTICULAR; INTRALESIONAL; INTRAMUSCULAR; INTRAVENOUS; SOFT TISSUE
Status: DISCONTINUED | OUTPATIENT
Start: 2024-11-01 | End: 2024-11-01 | Stop reason: SDUPTHER

## 2024-11-01 RX ORDER — LIDOCAINE HYDROCHLORIDE 20 MG/ML
INJECTION, SOLUTION INFILTRATION; PERINEURAL
Status: DISCONTINUED | OUTPATIENT
Start: 2024-11-01 | End: 2024-11-01 | Stop reason: SDUPTHER

## 2024-11-01 RX ORDER — SODIUM CHLORIDE 0.9 % (FLUSH) 0.9 %
5-40 SYRINGE (ML) INJECTION PRN
Status: DISCONTINUED | OUTPATIENT
Start: 2024-11-01 | End: 2024-11-01 | Stop reason: HOSPADM

## 2024-11-01 RX ORDER — ONDANSETRON 2 MG/ML
4 INJECTION INTRAMUSCULAR; INTRAVENOUS
Status: COMPLETED | OUTPATIENT
Start: 2024-11-01 | End: 2024-11-01

## 2024-11-01 RX ORDER — MIDAZOLAM HYDROCHLORIDE 1 MG/ML
INJECTION, SOLUTION INTRAMUSCULAR; INTRAVENOUS
Status: DISCONTINUED | OUTPATIENT
Start: 2024-11-01 | End: 2024-11-01 | Stop reason: SDUPTHER

## 2024-11-01 RX ORDER — PROMETHAZINE HYDROCHLORIDE 25 MG/1
25 TABLET ORAL EVERY 6 HOURS PRN
Qty: 40 TABLET | Refills: 0 | Status: SHIPPED | OUTPATIENT
Start: 2024-11-01

## 2024-11-01 RX ORDER — OXYCODONE HYDROCHLORIDE 5 MG/1
5 TABLET ORAL
Status: DISCONTINUED | OUTPATIENT
Start: 2024-11-01 | End: 2024-11-01 | Stop reason: HOSPADM

## 2024-11-01 RX ORDER — ONDANSETRON 2 MG/ML
INJECTION INTRAMUSCULAR; INTRAVENOUS
Status: DISCONTINUED | OUTPATIENT
Start: 2024-11-01 | End: 2024-11-01 | Stop reason: SDUPTHER

## 2024-11-01 RX ORDER — HYDROCODONE BITARTRATE AND ACETAMINOPHEN 7.5; 325 MG/1; MG/1
1 TABLET ORAL EVERY 6 HOURS PRN
Qty: 24 TABLET | Refills: 0 | Status: SHIPPED | OUTPATIENT
Start: 2024-11-01 | End: 2024-11-07

## 2024-11-01 RX ORDER — LABETALOL HYDROCHLORIDE 5 MG/ML
10 INJECTION, SOLUTION INTRAVENOUS
Status: DISCONTINUED | OUTPATIENT
Start: 2024-11-01 | End: 2024-11-01 | Stop reason: HOSPADM

## 2024-11-01 RX ORDER — ROCURONIUM BROMIDE 10 MG/ML
INJECTION, SOLUTION INTRAVENOUS
Status: DISCONTINUED | OUTPATIENT
Start: 2024-11-01 | End: 2024-11-01 | Stop reason: SDUPTHER

## 2024-11-01 RX ORDER — FENTANYL CITRATE 50 UG/ML
INJECTION, SOLUTION INTRAMUSCULAR; INTRAVENOUS
Status: DISCONTINUED | OUTPATIENT
Start: 2024-11-01 | End: 2024-11-01 | Stop reason: SDUPTHER

## 2024-11-01 RX ORDER — DEXMEDETOMIDINE HYDROCHLORIDE 100 UG/ML
INJECTION, SOLUTION INTRAVENOUS
Status: DISCONTINUED | OUTPATIENT
Start: 2024-11-01 | End: 2024-11-01 | Stop reason: SDUPTHER

## 2024-11-01 RX ORDER — PROCHLORPERAZINE EDISYLATE 5 MG/ML
5 INJECTION INTRAMUSCULAR; INTRAVENOUS
Status: DISCONTINUED | OUTPATIENT
Start: 2024-11-01 | End: 2024-11-01 | Stop reason: HOSPADM

## 2024-11-01 RX ORDER — HYDROMORPHONE HYDROCHLORIDE 2 MG/ML
0.5 INJECTION, SOLUTION INTRAMUSCULAR; INTRAVENOUS; SUBCUTANEOUS EVERY 5 MIN PRN
Status: DISCONTINUED | OUTPATIENT
Start: 2024-11-01 | End: 2024-11-01 | Stop reason: HOSPADM

## 2024-11-01 RX ORDER — OXYMETAZOLINE HYDROCHLORIDE 0.05 G/100ML
SPRAY NASAL
Status: COMPLETED | OUTPATIENT
Start: 2024-11-01 | End: 2024-11-01

## 2024-11-01 RX ORDER — CEFUROXIME AXETIL 250 MG/1
250 TABLET ORAL 2 TIMES DAILY
Qty: 14 TABLET | Refills: 0 | Status: SHIPPED | OUTPATIENT
Start: 2024-11-01 | End: 2024-11-08

## 2024-11-01 RX ORDER — NALOXONE HYDROCHLORIDE 0.4 MG/ML
INJECTION, SOLUTION INTRAMUSCULAR; INTRAVENOUS; SUBCUTANEOUS PRN
Status: DISCONTINUED | OUTPATIENT
Start: 2024-11-01 | End: 2024-11-01 | Stop reason: HOSPADM

## 2024-11-01 RX ORDER — PROPOFOL 10 MG/ML
INJECTION, EMULSION INTRAVENOUS
Status: DISCONTINUED | OUTPATIENT
Start: 2024-11-01 | End: 2024-11-01 | Stop reason: SDUPTHER

## 2024-11-01 RX ORDER — SODIUM CHLORIDE 9 MG/ML
INJECTION, SOLUTION INTRAVENOUS PRN
Status: DISCONTINUED | OUTPATIENT
Start: 2024-11-01 | End: 2024-11-01 | Stop reason: HOSPADM

## 2024-11-01 RX ORDER — EPHEDRINE SULFATE 50 MG/ML
INJECTION INTRAVENOUS
Status: DISCONTINUED | OUTPATIENT
Start: 2024-11-01 | End: 2024-11-01 | Stop reason: SDUPTHER

## 2024-11-01 RX ORDER — MEPERIDINE HYDROCHLORIDE 25 MG/ML
12.5 INJECTION INTRAMUSCULAR; INTRAVENOUS; SUBCUTANEOUS EVERY 5 MIN PRN
Status: DISCONTINUED | OUTPATIENT
Start: 2024-11-01 | End: 2024-11-01 | Stop reason: HOSPADM

## 2024-11-01 RX ORDER — SODIUM CHLORIDE 0.9 % (FLUSH) 0.9 %
5-40 SYRINGE (ML) INJECTION EVERY 12 HOURS SCHEDULED
Status: DISCONTINUED | OUTPATIENT
Start: 2024-11-01 | End: 2024-11-01 | Stop reason: HOSPADM

## 2024-11-01 RX ORDER — LIDOCAINE HYDROCHLORIDE AND EPINEPHRINE 10; 10 MG/ML; UG/ML
INJECTION, SOLUTION INFILTRATION; PERINEURAL
Status: COMPLETED | OUTPATIENT
Start: 2024-11-01 | End: 2024-11-01

## 2024-11-01 RX ORDER — HYDROMORPHONE HYDROCHLORIDE 2 MG/ML
INJECTION, SOLUTION INTRAMUSCULAR; INTRAVENOUS; SUBCUTANEOUS
Status: DISCONTINUED | OUTPATIENT
Start: 2024-11-01 | End: 2024-11-01 | Stop reason: SDUPTHER

## 2024-11-01 RX ORDER — OXYMETAZOLINE HYDROCHLORIDE 0.05 G/100ML
2 SPRAY NASAL
Status: COMPLETED | OUTPATIENT
Start: 2024-11-01 | End: 2024-11-01

## 2024-11-01 RX ADMIN — ROCURONIUM BROMIDE 30 MG: 10 INJECTION, SOLUTION INTRAVENOUS at 09:16

## 2024-11-01 RX ADMIN — DEXMEDETOMIDINE HYDROCHLORIDE 8 MCG: 100 INJECTION, SOLUTION INTRAVENOUS at 10:25

## 2024-11-01 RX ADMIN — ONDANSETRON 4 MG: 2 INJECTION, SOLUTION INTRAMUSCULAR; INTRAVENOUS at 08:06

## 2024-11-01 RX ADMIN — EPHEDRINE SULFATE 10 MG: 50 INJECTION, SOLUTION INTRAVENOUS at 08:20

## 2024-11-01 RX ADMIN — CEFAZOLIN 2000 MG: 2 INJECTION, POWDER, FOR SOLUTION INTRAMUSCULAR; INTRAVENOUS at 07:58

## 2024-11-01 RX ADMIN — ONDANSETRON 4 MG: 2 INJECTION, SOLUTION INTRAMUSCULAR; INTRAVENOUS at 12:31

## 2024-11-01 RX ADMIN — FENTANYL CITRATE 50 MCG: 50 INJECTION, SOLUTION INTRAMUSCULAR; INTRAVENOUS at 09:16

## 2024-11-01 RX ADMIN — HYDROMORPHONE HYDROCHLORIDE 0.5 MG: 2 INJECTION, SOLUTION INTRAMUSCULAR; INTRAVENOUS; SUBCUTANEOUS at 10:11

## 2024-11-01 RX ADMIN — SODIUM CHLORIDE: 9 INJECTION, SOLUTION INTRAVENOUS at 06:35

## 2024-11-01 RX ADMIN — Medication 100 MCG: at 08:06

## 2024-11-01 RX ADMIN — EPHEDRINE SULFATE 10 MG: 50 INJECTION, SOLUTION INTRAVENOUS at 09:26

## 2024-11-01 RX ADMIN — DEXMEDETOMIDINE HYDROCHLORIDE 8 MCG: 100 INJECTION, SOLUTION INTRAVENOUS at 10:09

## 2024-11-01 RX ADMIN — MIDAZOLAM 2 MG: 1 INJECTION INTRAMUSCULAR; INTRAVENOUS at 07:45

## 2024-11-01 RX ADMIN — PROPOFOL 200 MG: 10 INJECTION, EMULSION INTRAVENOUS at 07:52

## 2024-11-01 RX ADMIN — Medication 200 MCG: at 08:11

## 2024-11-01 RX ADMIN — LIDOCAINE HYDROCHLORIDE 100 MG: 20 INJECTION, SOLUTION INFILTRATION; PERINEURAL at 07:52

## 2024-11-01 RX ADMIN — SUGAMMADEX 200 MG: 100 INJECTION, SOLUTION INTRAVENOUS at 10:14

## 2024-11-01 RX ADMIN — OXYMETAZOLINE HYDROCHLORIDE 2 SPRAY: 0.5 SPRAY NASAL at 07:21

## 2024-11-01 RX ADMIN — DEXAMETHASONE SODIUM PHOSPHATE 12 MG: 4 INJECTION, SOLUTION INTRAMUSCULAR; INTRAVENOUS at 08:06

## 2024-11-01 RX ADMIN — HYDROMORPHONE HYDROCHLORIDE 0.5 MG: 2 INJECTION, SOLUTION INTRAMUSCULAR; INTRAVENOUS; SUBCUTANEOUS at 10:25

## 2024-11-01 RX ADMIN — FENTANYL CITRATE 50 MCG: 50 INJECTION, SOLUTION INTRAMUSCULAR; INTRAVENOUS at 07:52

## 2024-11-01 RX ADMIN — EPHEDRINE SULFATE 10 MG: 50 INJECTION, SOLUTION INTRAVENOUS at 08:15

## 2024-11-01 RX ADMIN — ROCURONIUM BROMIDE 70 MG: 10 INJECTION, SOLUTION INTRAVENOUS at 07:52

## 2024-11-01 RX ADMIN — EPHEDRINE SULFATE 10 MG: 50 INJECTION, SOLUTION INTRAVENOUS at 09:45

## 2024-11-01 RX ADMIN — EPHEDRINE SULFATE 10 MG: 50 INJECTION, SOLUTION INTRAVENOUS at 09:37

## 2024-11-01 ASSESSMENT — PAIN - FUNCTIONAL ASSESSMENT
PAIN_FUNCTIONAL_ASSESSMENT: NONE - DENIES PAIN
PAIN_FUNCTIONAL_ASSESSMENT: 0-10
PAIN_FUNCTIONAL_ASSESSMENT: NONE - DENIES PAIN

## 2024-11-01 NOTE — BRIEF OP NOTE
Brief Postoperative Note      Patient: Paramjit Valderrama  YOB: 1965  MRN: [id number]    Date of Procedure: 11/1/2024    Pre-Op Diagnosis Codes:   Chronic maxillary sinusitis, right  [J32.0]  Deviated nasal septum [J34.2]      Inferior turbinate hypertrophy  Nasal obstruction    Post-Op Diagnosis: Same      Procedure(s):  SEPTAL RECONSTRUCTION,ENDOSCOPIC SINUS SURGERY,RIGHT MAXILLARY ANTROSTOMY AND REMOVAL OF TISSUE, WITH CAUTERIZATION AND OUT PRESSURE OF INFERIOR TURBINATES    Surgeon(s):  Adalberto Villaseñor MD    Assistant:  * No surgical staff found *    Anesthesia: General    Estimated Blood Loss (mL): less than 50 (fi                                                                                                                                                                                                                                                                                                                                                                                                                                                                                                                                                                                                                                                                                                                                                                                                                                                                                                                                                                                                                                        fty)    Complications: None    Specimens:   ID Type Source Tests Collected by Time Destination   1 : 1: Right Maxillary Mucus and Fluids Tissue Sinus CULTURE, FUNGUS, CULTURE WITH SMEAR, ACID FAST BACILLIUS (Canceled), CULTURE, ANAEROBIC AND AEROBIC Adalberto Villaseñor MD 11/1/2024 0922    A : a) right

## 2024-11-01 NOTE — ANESTHESIA POSTPROCEDURE EVALUATION
Department of Anesthesiology  Postprocedure Note    Patient: Paramjit Valderrama  MRN: [id number]  YOB: 1965  Date of evaluation: 11/1/2024    Procedure Summary       Date: 11/01/24 Room / Location: [address]    Anesthesia Start: 0745 Anesthesia Stop: 1027    Procedure: SEPTAL RECONSTRUCTION,ENDOSCOPIC SINUS SURGERY,RIGHT MAXILLARY ANTROSTOMY AND REMOVAL OF TISSUE, WITH CAUTERIZATION AND OUT PRESSURE OF INFERIOR TURBINATES (Right: Nose) Diagnosis:       Chronic maxillary sinusitis      Deviated nasal septum      (Chronic maxillary sinusitis [J32.0])      (Deviated nasal septum [J34.2])    Surgeons: Adalberto Villaseñor MD Responsible Provider: Kb Leal MD    Anesthesia Type: general ASA Status: 2            Anesthesia Type: No value filed.    Jun Phase I: Jun Score: 8    Jun Phase II:      Anesthesia Post Evaluation    Patient location during evaluation: PACU  Patient participation: complete - patient participated  Level of consciousness: awake and alert  Airway patency: patent  Nausea & Vomiting: no nausea and no vomiting  Cardiovascular status: hemodynamically stable  Respiratory status: acceptable  Hydration status: euvolemic  Multimodal analgesia pain management approach  Pain management: satisfactory to patient    No notable events documented.

## 2024-11-01 NOTE — PROGRESS NOTES
Teaching/ education completed for home care including pain management, wound care,activity,safety precautions and infection control. Patient and spouse verbalized understanding.

## 2024-11-01 NOTE — OP NOTE
Operative Note      Patient: Paramjit Valderrama  YOB: 1965  MRN: [id number]    Date of Procedure: 11/1/2024    Pre-Op Diagnosis Codes:      * Chronic maxillary sinusitis [J32.0]     * Deviated nasal septum [J34.2]    Post-Op Diagnosis: {MH OR SAME:[id number]}       Procedure(s):  SEPTAL RECONSTRUCTION,ENDOSCOPIC SINUS SURGERY,RIGHT MAXILLARY ANTROSTOMY AND REMOVAL OF TISSUE, WITH CAUTERIZATION AND OUT PRESSURE OF INFERIOR TURBINATES    Surgeon(s):  Adalberto Villaseñor MD    Assistant:   * No surgical staff found *    Anesthesia: General    Estimated Blood Loss (mL): {NUMBERS; EBL:07734}    Complications: {Symptoms; Intra-op complications:02311}    Specimens:   ID Type Source Tests Collected by Time Destination   1 : 1: Right Maxillary Mucus and Fluids Tissue Sinus CULTURE, FUNGUS, CULTURE WITH SMEAR, ACID FAST BACILLIUS (Canceled), CULTURE, ANAEROBIC AND AEROBIC Adalberto Villaseñor MD 11/1/2024 0922    A : a) right maxullary sinus tissue Tissue Tissue SURGICAL PATHOLOGY Adalberto Villaseñor MD 11/1/2024 0936    B : B) right sinus contents Tissue Tissue SURGICAL PATHOLOGY Adalberto Villaseñor MD 11/1/2024 0937        Implants:  * No implants in log *      Drains: * No LDAs found *    Findings:  Infection Present At Time Of Surgery (PATOS) (choose all levels that have infection present):  {PATOS LEVELS:049010591}  Other Findings: ***    Detailed Description of Procedure:   ***    Electronically signed by Adalberto Villaseñor MD on 11/1/2024 at 11:08 AM

## 2024-11-01 NOTE — PROGRESS NOTES
Denies nausea,nasal irrigation kit given per instructions,  Discharge instructions reviewed with patient/responsible adult. All home medications have been reviewed, questions answered and patient and spouse verbalized understanding.  Discharge instructions signed and copies given. Patient discharged  per w/c with belongings.

## 2024-11-01 NOTE — ANESTHESIA PRE PROCEDURE
Department of Anesthesiology  Preprocedure Note       Name:  Paramjit Valderrama   Age:  59 y.o.  :  1965                                          MRN:  [id number]         Date:  2024      Surgeon: Surgeon(s):  Adalberto Villaseñor MD    Procedure: Procedure(s):  ENDOSCOPIC SINUS SURGERY , RIGHT MAXILLARY, WITH IMAGE GUIDANCE, EXCISION OF POLYPS /TISSUE, SUBLABIAL PUNCTURE OR INCISION AND ENGLISH DELONTE APPROACH, POSSIBLE SEPTAL RECONSTRUCTION AND/OR PARTIAL EXCISION , OUTFRACTURE, OR CAUTERIZATION OF NASAL TURBINATES    Medications prior to admission:   Prior to Admission medications    Medication Sig Start Date End Date Taking? Authorizing Provider   cetirizine (ZYRTEC) 10 MG tablet Take 1 tablet by mouth daily   Yes Selene Brooks MD   metoprolol succinate (TOPROL XL) 100 MG extended release tablet Take 1 tablet by mouth daily   Yes Selene Brooks MD   tamsulosin (FLOMAX) 0.4 MG capsule Take 1 capsule by mouth nightly 24  Yes Selene Brooks MD   atorvastatin (LIPITOR) 10 MG tablet TAKE 1 TABLET BY MOUTH ONE TIME A DAY 17  Yes Paramjit Gonzalez DO   fluticasone (FLONASE) 50 MCG/ACT nasal spray INHALE ONE SPRAY IN EACH NOSTRIL ONCE DAILY 24   Selene Brooks MD   hydroCHLOROthiazide (HYDRODIURIL) 25 MG tablet Take 1 tablet by mouth daily    Selene Brooks MD   aspirin 81 MG chewable tablet Take 1 tablet by mouth daily    Selene Brooks MD   folic acid (FOLVITE) 400 MCG tablet Take 400 mcg by mouth daily. Indications: Increased Amount of Homocysteine in the Blood  Patient not taking: Reported on 2024 2/15/14   Selene Brooks MD       Current medications:    Current Facility-Administered Medications   Medication Dose Route Frequency Provider Last Rate Last Admin   • 0.9 % sodium chloride infusion   IntraVENous Continuous Adalberto Villaseñor  mL/hr at 24 0635 New Bag at 24 0635       Allergies:    Allergies   Allergen Reactions   •

## 2024-11-01 NOTE — H&P
Date of Surgery Update:  Paramjit Valderrama was seen, history and physical examination reviewed, and patient examined by me today. There have been no significant clinical changes since the completion of the previous history and physical.    The risk, benefits, and alternatives of the proposed procedure have been explained to the patient (or appropriate guardian) and understanding verbalized. All questions answered. Patient wishes to proceed.    Electronically signed by: Adalberto Villaseñor MD,11/1/2024,7:16 AM

## 2024-11-01 NOTE — PROGRESS NOTES
Asleep awakens easily with verbal stimuli during sleep mouth breathing regular and even , semi fowlers, room 89% placed simple mask @ 5 liters 97%,

## 2024-11-01 NOTE — DISCHARGE INSTRUCTIONS
[facility]  [facility]    Adalberto Villaseñor M.D.  [facility] [facility], [address]  [place] [id number] [phone]        ADDITIONAL POST OPERATIVE INSTRUCTIONS  SEPTAL RECONSTRUCTION AND RIGHT MAXILLARY ENDOSCOPIC SINUS SURGERY      ACTIVITY  You should be up and walking and moving about beginning the evening of surgery.  Exercise caution due to the sedative effects (drowsiness, sleepiness) of your medications.  You may resume normal nonstrenuous activity upon arrival home.  When you rest in bed, elevate your head with your face up, on the day of surgery and the first day after surgery.  Elevate your head at all times, using two or three pillows when lying down, for the first week after surgery.  Please refrain from any strenuous physical activity or exercise for two weeks after surgery.      DRAINING AND BLEEDING are normal after nasal surgery.  Change your mustache dressing under the nose as it becomes soiled or soaked.  Continue using a mustache dressing under the nose until nasal bleeding and/or drainage cease.  If bleeding occurs, lie with the head elevated on two pillows.  Put crushed ice in a plastic sandwich bag wrapped in cloth, such as a cotton mohini-shirt, and place on the forehead.      SWELLING of the upper lip and face are common after surgery.  This will subside after a few days.      RELTOK NASAL AIRWAY / NASAL PACKING  Nasal sponge packing, septal splints, and Reltok nasal airways were used.  This nasal airways may allow near normal breathing through the nose.  They will also act to vent and relieve pressure when swallowing.  You may be able to breathe through your nose via the tubes.  Watery eyes and nasal discomfort will subside once the packing, splints, and airways are removed by the doctor.  DO NOT REMOVE THE PACKING YOURSELF.  The nasal tubes you should be irrigated using the syringe and adapter in the packet you were provided after surgery.  Irrigate each tube with normal saline  irrigation solution if you have that available.  Alternatively, you may use a solution of 2 cups distilled water + 1 teaspoon of salt + 1 tsp of baking soda.  DO NOT USE TAP WATER!  Do not blow out through your nose because this will force mucus into the tubes, which will harden and plug the tubes.  If that occurs, you will have to breathe exclusively through your mouth and wait until the tubes and packing are removed.  After the are removed, spray/mist both sides of your nose gently with saline nasal spray (e.g. Ocean, AYR) every two  hours while awake for the first week and thereafter four times daily and as needed for dryness or crusting.      NOSE BLOWING  Do not blow your nose for 10 days after surgery.  Then you may gently blow your nose, but do not move your nose to either side.        Take only those medications prescribed by Doctor Villaseñor, and your usual prescribed medications, if approved by Doctor Villaseñor.       QUESTIONS?   If you have any questions or have any problems after surgery, please call the office at [phone].        ANESTHESIA DISCHARGE INSTRUCTIONS    Wear your seatbelt home.  You are under the influence of drugs-do not drink alcohol, drive, operate machinery, make any important decisions or sign any legal documents for 24 hours.  A responsible adult needs to be with you for 24 hours.  You may experience lightheadedness, dizziness, or sleepiness following surgery.  Rest at home today- increase activity as tolerated.  Progress slowly to a regular diet unless your physician has instructed you otherwise. Drink plenty of water.  If persistent nausea and vomiting becomes a problem, call your physician.  Coughing, sore throat and muscle aches are other side effects of anesthesia, and should improve with time.  Do not drive or operate machinery while taking narcotics.

## 2024-11-01 NOTE — PROGRESS NOTES
Received from OR - Drowsy,simple mask @ 95%,small amount bloody drainage present mustache dressing applied ,vss

## 2024-11-03 LAB
BACTERIA SPEC AEROBE CULT: ABNORMAL
BACTERIA SPEC ANAEROBE CULT: ABNORMAL
GRAM STN SPEC: ABNORMAL
ORGANISM: ABNORMAL

## 2024-11-04 ENCOUNTER — OFFICE VISIT (OUTPATIENT)
Dept: ENT CLINIC | Age: 59
End: 2024-11-04

## 2024-11-04 VITALS
DIASTOLIC BLOOD PRESSURE: 91 MMHG | HEIGHT: 75 IN | WEIGHT: 245 LBS | HEART RATE: 54 BPM | SYSTOLIC BLOOD PRESSURE: 156 MMHG | BODY MASS INDEX: 30.46 KG/M2 | TEMPERATURE: 97.8 F | OXYGEN SATURATION: 97 %

## 2024-11-04 DIAGNOSIS — Z09 POSTOP CHECK: Primary | ICD-10-CM

## 2024-11-04 PROCEDURE — 99024 POSTOP FOLLOW-UP VISIT: CPT | Performed by: OTOLARYNGOLOGY

## 2024-11-04 NOTE — PROGRESS NOTES
POST-OP NOTE  Chief Complaint   Patient presents with    Post-Op Check       HISTORY:     POD #3, post nasal septal reconstruction and functional scopic sinus surgery, right maxillary sinus.he denied any current problems.  He was able to breathe through the nasal airways but this did plugged up on the second day.        EXAMINATION:        Vitals:    11/04/24 0848   BP: (!) 156/91   Pulse: 54   Temp: 97.8 °F (36.6 °C)   SpO2: 97%      WDWN, awake and alert, with no evidence of distress.  Nose: The Reltok nasal airways and splints and the Johnston Merocel SlimLine nasal packs were removed without difficulty.  Mucous and clotted blood was evacuated from the nasal cavities with a nasal suction.  The nasal septum appeared to be midline.  The inferior turbinates appeared to be well lateralized and reduced.  Nasal airway appeared to be patent with normal nasal airflow bilaterally.  There was no active bleeding.      IMPRESSION / DIAGNOSES / ORDERS   Paramjit was seen today for post-op check.    Diagnoses and all orders for this visit:    Postop check    Doing well postop with no evidence of perioperative complication.      RECOMMENDATIONS / PLAN     Patient Instructions     Follow post op instructions in the after visit summary you were given after surgery.  Continue post-op medications as prescribed.      Follow-up  Return in 10 days (on 11/14/2024) for post op check and possible sinus debridement right side.

## 2024-11-04 NOTE — PATIENT INSTRUCTIONS
Follow post op instructions in the after visit summary you were given after surgery.  Continue post-op medications as prescribed.

## 2024-11-11 LAB
FUNGUS SPEC CULT: NORMAL
LOEFFLER MB STN SPEC: NORMAL

## 2024-11-14 ENCOUNTER — OFFICE VISIT (OUTPATIENT)
Dept: ENT CLINIC | Age: 59
End: 2024-11-14

## 2024-11-14 VITALS
DIASTOLIC BLOOD PRESSURE: 96 MMHG | BODY MASS INDEX: 31.33 KG/M2 | HEIGHT: 75 IN | HEART RATE: 70 BPM | SYSTOLIC BLOOD PRESSURE: 156 MMHG | OXYGEN SATURATION: 97 % | WEIGHT: 252 LBS | TEMPERATURE: 97.2 F

## 2024-11-14 DIAGNOSIS — Z09 POSTOP CHECK: Primary | ICD-10-CM

## 2024-11-14 PROCEDURE — 99024 POSTOP FOLLOW-UP VISIT: CPT | Performed by: OTOLARYNGOLOGY

## 2024-11-14 NOTE — PROGRESS NOTES
POST-OP NOTE  Chief Complaint   Patient presents with    Post-Op Check       HISTORY:     POD #13, post septal reconstruction, turbinate reduction, and right middle meatal maxillary antrostomy with excision of tissue, on 11/01/2024.  He stated he is doing well with no bleeding or other problems related to the surgery.      EXAMINATION:        Vitals:    11/14/24 1257   BP: (!) 156/96   Pulse: 70   Temp: 97.2 °F (36.2 °C)   SpO2: 97%      WDWN, awake and alert, with no evidence of distress.  (+) Nose:  mucus crusting partially occluding both nasal cavities, removed with bayonet forceps and then nasal airway was widely patent with normal nasal airflow.  Septum appeared to be midline and inferior turbinated reduced and well lateralized.  Middle meatus clear        IMPRESSION / DIAGNOSES / ORDERS   Paramjit was seen today for post-op check.    Diagnoses and all orders for this visit:    Postop check        RECOMMENDATIONS / PLAN   Paramjit was advised to follow the post-op instructions in the after visit summary given after surgery.  Paramjit was advised to continue post-op medications as prescribed.    Return in about 2 weeks (around 11/28/2024) for nasal endoscopy and sinus debridement, and post op check.

## 2024-11-18 LAB
FUNGUS SPEC CULT: NORMAL
LOEFFLER MB STN SPEC: NORMAL

## 2024-11-25 LAB
FUNGUS SPEC CULT: NORMAL
LOEFFLER MB STN SPEC: NORMAL

## 2024-11-27 ENCOUNTER — OFFICE VISIT (OUTPATIENT)
Dept: ENT CLINIC | Age: 59
End: 2024-11-27
Payer: COMMERCIAL

## 2024-11-27 VITALS
WEIGHT: 246 LBS | TEMPERATURE: 97.3 F | DIASTOLIC BLOOD PRESSURE: 86 MMHG | HEIGHT: 75 IN | BODY MASS INDEX: 30.59 KG/M2 | SYSTOLIC BLOOD PRESSURE: 135 MMHG | HEART RATE: 69 BPM | OXYGEN SATURATION: 96 %

## 2024-11-27 DIAGNOSIS — Z98.890 STATUS POST FUNCTIONAL ENDOSCOPIC SINUS SURGERY: ICD-10-CM

## 2024-11-27 DIAGNOSIS — J32.0 CHRONIC MAXILLARY SINUSITIS: Primary | Chronic | ICD-10-CM

## 2024-11-27 PROCEDURE — 31237 NSL/SINS NDSC SURG BX POLYPC: CPT | Performed by: OTOLARYNGOLOGY

## 2024-11-27 NOTE — PROGRESS NOTES
Chief Complaint   Patient presents with    Sinusitis    Post functional endoscopic sinus surgery, right maxillary s       PROCEDURE:   Nasal endoscopy and sinus debridement #1, right side only (CPT 48293-UJ, 79)       INTERVAL HISTORY:    Doing well with no problems.  Patient stated he is breathing much better through both sides of his nose than before surgery.      COUNSELING:  Risks, benefits, and alternatives of diagnostic nasal endoscopy were explained to the patient.  Specific mention was made of the risk of nosebleed, drainage, throat numbness with difficulty swallowing, and pain from the procedure.  The patient gave oral consent to proceed.        FINDINGS:   The middle meatus, ethmoid cavity, and frontal recess were clear.  The frontal sinus ostium was patent and the frontal sinus transilluminated well.  The maxillary antrostomy was patent.        DESCRIPTION OF PROCEDURE:   The left and right nasal nasal cavities were decongested and anesthetized with a mixture of equal parts of 0.05% oxymetazoline solution and 4% lidocaine solution by nasal sprayer.  After an appropriate elapse of time, the rigid nasal telescope was inserted into the right nasal cavity, using the [employer] video system.  Debridement of devitalized tissue, mucus crusts, and debris was accomplished.  There was no bleeding.  The tissues appeared to be healing well.  There were no polyps.      Diagnostic nasal endoscopy of the left nasal cavity was normal.  A moderate amount of mucus crusting was removed from the left inferior turbinate with bayonet forceps.  There was a Lorie formation of the synechiae between the middle turbinate and nasal septum on the left side.  The left nasal airway was clear and patent with normal airflow after that.  The procedure was repeated on the left side with identical findings.  The patient tolerated the procedure well.           IMPRESSION / DIAGNOSES / ORDERS / PROCEDURES:       Paramjit was seen today for

## 2024-12-02 LAB
FUNGUS SPEC CULT: NORMAL
LOEFFLER MB STN SPEC: NORMAL

## 2024-12-12 ENCOUNTER — TELEPHONE (OUTPATIENT)
Dept: ENT CLINIC | Age: 59
End: 2024-12-12

## 2024-12-12 NOTE — TELEPHONE ENCOUNTER
Pt was scheduled with  for 12/12. He would like to be rescheduled, informed him of  next available and he said she has been seeing  every two weeks and would like to be seen sooner than Doron. Informed him I would send over a msg and someone would get back with him.

## 2024-12-12 NOTE — TELEPHONE ENCOUNTER
Please call patient and see if he can come in Wednesday at 3 PM for nasal endoscopy and possible debridement.  Let me know if this date and time does not work out for him

## 2024-12-18 ENCOUNTER — OFFICE VISIT (OUTPATIENT)
Dept: ENT CLINIC | Age: 59
End: 2024-12-18

## 2024-12-18 VITALS
TEMPERATURE: 97.5 F | HEIGHT: 75 IN | SYSTOLIC BLOOD PRESSURE: 133 MMHG | HEART RATE: 76 BPM | DIASTOLIC BLOOD PRESSURE: 86 MMHG | WEIGHT: 248 LBS | BODY MASS INDEX: 30.84 KG/M2 | OXYGEN SATURATION: 97 %

## 2024-12-18 DIAGNOSIS — J32.0 CHRONIC MAXILLARY SINUSITIS: Primary | ICD-10-CM

## 2024-12-18 DIAGNOSIS — Z98.890 STATUS POST FUNCTIONAL ENDOSCOPIC SINUS SURGERY: ICD-10-CM

## 2024-12-18 NOTE — PROGRESS NOTES
Chief Complaint   Patient presents with    Sinusitis    Post functional endoscopic sinus surgery, right maxillary s       PROCEDURE:   Nasal endoscopy and sinus debridement #2 (CPT 57527-UG. 79)       INTERVAL HISTORY:    Doing well with no problems.      COUNSELING:  Risks, benefits, and alternatives of diagnostic nasal endoscopy were explained to the patient.  Specific mention was made of the risk of nosebleed, drainage, throat numbness with difficulty swallowing, and pain from the procedure.  The patient gave oral consent to proceed.        FINDINGS:   The right middle meatus, ethmoid cavity, and frontal recess were clear.  The right maxillary antrostomy was patent.        DESCRIPTION OF PROCEDURE:   The left and right nasal nasal cavities were decongested and anesthetized with a mixture of equal parts of 0.05% oxymetazoline solution and 4% lidocaine solution by nasal sprayer.  After an appropriate elapse of time, the rigid nasal telescope was inserted into the right nasal cavity, using the [employer] video system.  Debridement of mucus crusts and debris was accomplished.  Bleeding was minimal.  The tissues appeared to be healing well.  There were no polyps.  The procedure was repeated on the left side with identical findings.  The patient tolerated the procedure well.           IMPRESSION / DIAGNOSES / ORDERS / PROCEDURES:       Paramjit was seen today for sinusitis and post functional endoscopic sinus surgery, right maxillary s.    Diagnoses and all orders for this visit:    Status post functional endoscopic sinus surgery    Chronic maxillary sinusitis        RECOMMENDATIONS / PLAN:   Continue nasal irrigations to both nostrils, three times a day, as instructed, until you return to the office.    Continue post op instructions per after visit summary given after surgery.  Return in two weeks for next nasal endoscopy and sinus debridement.

## 2025-01-02 ENCOUNTER — OFFICE VISIT (OUTPATIENT)
Dept: ENT CLINIC | Age: 60
End: 2025-01-02
Payer: COMMERCIAL

## 2025-01-02 VITALS
SYSTOLIC BLOOD PRESSURE: 147 MMHG | DIASTOLIC BLOOD PRESSURE: 103 MMHG | HEIGHT: 75 IN | WEIGHT: 252 LBS | BODY MASS INDEX: 31.33 KG/M2 | TEMPERATURE: 97.1 F | HEART RATE: 61 BPM | OXYGEN SATURATION: 97 %

## 2025-01-02 DIAGNOSIS — J33.0 POLYP OF NASAL CAVITY: Primary | ICD-10-CM

## 2025-01-02 DIAGNOSIS — Z98.890 STATUS POST FUNCTIONAL ENDOSCOPIC SINUS SURGERY: ICD-10-CM

## 2025-01-02 DIAGNOSIS — J32.0 CHRONIC MAXILLARY SINUSITIS: ICD-10-CM

## 2025-01-02 PROCEDURE — 99024 POSTOP FOLLOW-UP VISIT: CPT | Performed by: OTOLARYNGOLOGY

## 2025-01-02 PROCEDURE — 31231 NASAL ENDOSCOPY DX: CPT | Performed by: OTOLARYNGOLOGY

## 2025-01-02 RX ORDER — MOMETASONE FUROATE MONOHYDRATE 50 UG/1
SPRAY, METERED NASAL
Qty: 17 G | Refills: 5 | Status: SHIPPED | OUTPATIENT
Start: 2025-01-02

## 2025-01-02 NOTE — PROGRESS NOTES
Chief Complaint   Patient presents with    Sinusitis    Nasal Polyps    Post functional endoscopic sinus surgery         PROCEDURE - Diagnostic Nasal Endoscopy, right  [CPT 78261]    INFORMED CONSENT    Risks, benefits, and alternatives of diagnostic nasal endoscopy were explained to the patient.  Specific mention was made of the risk of nosebleed, drainage, throat numbness with difficulty swallowing, and pain from the procedure.  The patient gave oral consent to proceed.        INDICATIONS/HISTORY  Post right FESS and polypectomy.  Patient stated he is breathing well through both sides of the nose and much better than before surgery.        FINDINGS    There were post surgical changes in the right nasal cavity; all well healed, with no evidence of active sinus disease.  The middle meatus, ethmoid cavity, and frontal recess were clear.  The nasal septum appeared to be midline.  The right middle and inferior turbinates appeared to be normal.  There was no purulent exudate, polyp, mass, or lesion appreciated in the right nasal cavity.  The right olfactory cleft appeared to be clear.  The right middle meatus appeared to be clear and patent.  The sphenoethmoid recess appeared to be clear and patent.  The remainder of the right nasal cavity appeared to be normal.         DESCRIPTION OF PROCEDURE    The nasal cavities were decongested and anesthetized with a mixture of equal parts of 0.05% oxymetazoline solution and 4% lidocaine solution by nasal sprayer.  This was repeated after 5 minutes.  After an appropriate elapse of time, the 4.4 mm 30 degree rigid nasal telescope was inserted into the left nasal cavity.  Endoscopy of the inferior and middle meatus and nasal cavity to the posterior choana was performed with the above findings.  The procedure was repeated on the right side with the above findings.  The patient tolerated the procedure well with no evidence of complication.  Instructions were given to avoid eating or

## 2025-07-30 ENCOUNTER — OFFICE VISIT (OUTPATIENT)
Dept: ORTHOPEDIC SURGERY | Age: 60
End: 2025-07-30

## 2025-07-30 VITALS — WEIGHT: 252.2 LBS | BODY MASS INDEX: 31.36 KG/M2 | HEIGHT: 75 IN

## 2025-07-30 DIAGNOSIS — M19.042 DEGENERATIVE ARTHRITIS OF METACARPOPHALANGEAL JOINT OF LEFT THUMB: Primary | ICD-10-CM

## 2025-07-30 PROBLEM — M25.342 CHRONIC INSTABILITY OF METACARPOPHALANGEAL JOINT OF LEFT THUMB: Status: ACTIVE | Noted: 2025-07-30

## 2025-07-30 NOTE — PROGRESS NOTES
This 60 y.o. right hand dominant retired man is seen in consultation for Karyn Taylor DO  with a chief complaint of pain and instability in the left thumb.  Symptoms have been present for 40 years.  There is history of several injuries to this digit.  Pain is mild and reasonably tolerable.  Despite the instability he has been able to function well with no loss of strength or dexterity.  Similar pain is not noted in the opposite upper extremity. Symptoms have not changed recently. The pain assessment has been reviewed and is correct. He will be losing his insurance soon and would like to have the thumb checked.    The patient's social history, past medical history, family history, medications, allergies and review of systems, entered 7/230/25, have been reviewed, and dated and are recorded in the chart.    On physical examination the patient is Height: 190.5 cm (6' 3\") tall and weighs Weight - Scale: 114.4 kg (252 lb 3.2 oz).  Respirations are 18 per minute.  The patient is well nourished, is oriented to time and place, demonstrates appropriate mood and affect as well as normal gait and station.  There is mild soft tissue swelling and bony thickening present about the left thumb MP joint.  There is no discoloration.  There is a ulnar angulation deformity at the MP joint, but no atrophy.  Tenderness is not present on palpation.  Range of motion of the left thumb is mildly limited only on the left and is not accompanied by pain.  Skin is intact, as is distal circulation and sensation.  Gross muscle strength is normal bilaterally.  There radial collateral ligament of the MP joint of the left thumb is grossly unstable.  There are no subcutaneous nodules or enlarged epitrochlear lymph nodes.    I have personally reviewed and interpreted all pertinent external imaging studies, laboratory tests, diagnostic proceedures and medical encounters relative to this patient's visit today.    Xrays: AP and lateral Xrays

## (undated) DEVICE — SURGICAL SUCTION CONNECTING TUBE WITH MALE CONNECTOR AND SUCTION CLAMP, 2 FT. LONG (.6 M), 5 MM I.D.: Brand: CONMED

## (undated) DEVICE — Device

## (undated) DEVICE — NEEDLE, QUINCKE, 25GX2.5": Brand: MEDLINE

## (undated) DEVICE — SOLUTION IRRIG 500ML 0.9% SOD CHLO USP POUR PLAS BTL

## (undated) DEVICE — CODMAN® SURGICAL PATTIES 1/2" X 3" (1.27CM X 7.62CM): Brand: CODMAN®

## (undated) DEVICE — KIT,ANTI FOG,W/SPONGE & FLUID,SOFT PACK: Brand: MEDLINE

## (undated) DEVICE — GAUZE,SPONGE,4"X4",8PLY,STRL,LF,10/TRAY: Brand: MEDLINE

## (undated) DEVICE — TUBING 1895522 5PK STRAIGHTSHOT TO XPS: Brand: STRAIGHTSHOT®

## (undated) DEVICE — SYSTEM AIRWAY NASAL CLEAR FLOW RELTOK

## (undated) DEVICE — 3M™ STERI-DRAPE™ INSTRUMENT POUCH 1018: Brand: STERI-DRAPE™

## (undated) DEVICE — TUBING, SUCTION, 1/4" X 10', STRAIGHT: Brand: MEDLINE

## (undated) DEVICE — MEDICINE CUP, GRADUATED, STER: Brand: MEDLINE

## (undated) DEVICE — WIPE INSTR W73XL73CM VISITEC

## (undated) DEVICE — PATIENT TRACKER 9734887XOM NON-INVASIVE

## (undated) DEVICE — HYPODERMIC SAFETY NEEDLE: Brand: MAGELLAN

## (undated) DEVICE — STAPLER SKIN H3.9MM WIRE DIA0.58MM CRWN 6.9MM 35 STPL ROT

## (undated) DEVICE — PACKING 440413 10PK DOYLE SLIMLINE: Brand: MEROCEL®

## (undated) DEVICE — BLADE 1884080EM TRICUT 4MMX13CM M4 ROHS: Brand: FUSION®

## (undated) DEVICE — PACKING NSL W4XL4CM SNUS STNT DISP MEROGEL

## (undated) DEVICE — ADHESIVE LIQ MED 2/3 CC VI DEV REL INJ LO RISK MASTISOL

## (undated) DEVICE — SYRINGE MED 10ML TRNSLUC BRL PLUNG BLK MRK POLYPR CTRL

## (undated) DEVICE — GLOVE ORANGE PI 8   MSG9080

## (undated) DEVICE — TOWEL,OR,DSP,ST,BLUE,STD,4/PK,20PK/CS: Brand: MEDLINE

## (undated) DEVICE — ESWAB LQ AMIES  REG. NYLON FLOCKED  APPLICATOR: Brand: ESWAB™

## (undated) DEVICE — SCALPEL 15 DEG NO 715

## (undated) DEVICE — ENT MINOR: Brand: MEDLINE INDUSTRIES, INC.